# Patient Record
Sex: MALE | Race: BLACK OR AFRICAN AMERICAN | NOT HISPANIC OR LATINO | URBAN - METROPOLITAN AREA
[De-identification: names, ages, dates, MRNs, and addresses within clinical notes are randomized per-mention and may not be internally consistent; named-entity substitution may affect disease eponyms.]

---

## 2017-03-29 ENCOUNTER — OUTPATIENT (OUTPATIENT)
Dept: OUTPATIENT SERVICES | Facility: HOSPITAL | Age: 42
LOS: 1 days | Discharge: HOME | End: 2017-03-29

## 2017-06-27 DIAGNOSIS — K02.63 DENTAL CARIES ON SMOOTH SURFACE PENETRATING INTO PULP: ICD-10-CM

## 2020-04-20 ENCOUNTER — EMERGENCY (EMERGENCY)
Facility: HOSPITAL | Age: 45
LOS: 0 days | Discharge: HOME | End: 2020-04-21
Attending: EMERGENCY MEDICINE | Admitting: EMERGENCY MEDICINE
Payer: COMMERCIAL

## 2020-04-20 VITALS
TEMPERATURE: 98 F | RESPIRATION RATE: 18 BRPM | SYSTOLIC BLOOD PRESSURE: 143 MMHG | HEART RATE: 77 BPM | OXYGEN SATURATION: 99 % | DIASTOLIC BLOOD PRESSURE: 76 MMHG

## 2020-04-20 DIAGNOSIS — R07.89 OTHER CHEST PAIN: ICD-10-CM

## 2020-04-20 DIAGNOSIS — R07.9 CHEST PAIN, UNSPECIFIED: ICD-10-CM

## 2020-04-20 LAB
ALBUMIN SERPL ELPH-MCNC: 4.5 G/DL — SIGNIFICANT CHANGE UP (ref 3.5–5.2)
ALP SERPL-CCNC: 63 U/L — SIGNIFICANT CHANGE UP (ref 30–115)
ALT FLD-CCNC: 19 U/L — SIGNIFICANT CHANGE UP (ref 0–41)
ANION GAP SERPL CALC-SCNC: 10 MMOL/L — SIGNIFICANT CHANGE UP (ref 7–14)
AST SERPL-CCNC: 24 U/L — SIGNIFICANT CHANGE UP (ref 0–41)
BASOPHILS # BLD AUTO: 0.02 K/UL — SIGNIFICANT CHANGE UP (ref 0–0.2)
BASOPHILS NFR BLD AUTO: 0.3 % — SIGNIFICANT CHANGE UP (ref 0–1)
BILIRUB SERPL-MCNC: 0.3 MG/DL — SIGNIFICANT CHANGE UP (ref 0.2–1.2)
BUN SERPL-MCNC: 13 MG/DL — SIGNIFICANT CHANGE UP (ref 10–20)
CALCIUM SERPL-MCNC: 9.8 MG/DL — SIGNIFICANT CHANGE UP (ref 8.5–10.1)
CHLORIDE SERPL-SCNC: 103 MMOL/L — SIGNIFICANT CHANGE UP (ref 98–110)
CO2 SERPL-SCNC: 26 MMOL/L — SIGNIFICANT CHANGE UP (ref 17–32)
CREAT SERPL-MCNC: 0.9 MG/DL — SIGNIFICANT CHANGE UP (ref 0.7–1.5)
EOSINOPHIL # BLD AUTO: 0.1 K/UL — SIGNIFICANT CHANGE UP (ref 0–0.7)
EOSINOPHIL NFR BLD AUTO: 1.3 % — SIGNIFICANT CHANGE UP (ref 0–8)
GLUCOSE SERPL-MCNC: 92 MG/DL — SIGNIFICANT CHANGE UP (ref 70–99)
HCT VFR BLD CALC: 43.4 % — SIGNIFICANT CHANGE UP (ref 42–52)
HGB BLD-MCNC: 14.5 G/DL — SIGNIFICANT CHANGE UP (ref 14–18)
IMM GRANULOCYTES NFR BLD AUTO: 0.1 % — SIGNIFICANT CHANGE UP (ref 0.1–0.3)
LYMPHOCYTES # BLD AUTO: 1.57 K/UL — SIGNIFICANT CHANGE UP (ref 1.2–3.4)
LYMPHOCYTES # BLD AUTO: 19.8 % — LOW (ref 20.5–51.1)
MCHC RBC-ENTMCNC: 25.7 PG — LOW (ref 27–31)
MCHC RBC-ENTMCNC: 33.4 G/DL — SIGNIFICANT CHANGE UP (ref 32–37)
MCV RBC AUTO: 77 FL — LOW (ref 80–94)
MONOCYTES # BLD AUTO: 0.72 K/UL — HIGH (ref 0.1–0.6)
MONOCYTES NFR BLD AUTO: 9.1 % — SIGNIFICANT CHANGE UP (ref 1.7–9.3)
NEUTROPHILS # BLD AUTO: 5.49 K/UL — SIGNIFICANT CHANGE UP (ref 1.4–6.5)
NEUTROPHILS NFR BLD AUTO: 69.4 % — SIGNIFICANT CHANGE UP (ref 42.2–75.2)
NRBC # BLD: 0 /100 WBCS — SIGNIFICANT CHANGE UP (ref 0–0)
PLATELET # BLD AUTO: 272 K/UL — SIGNIFICANT CHANGE UP (ref 130–400)
POTASSIUM SERPL-MCNC: 5.1 MMOL/L — HIGH (ref 3.5–5)
POTASSIUM SERPL-SCNC: 5.1 MMOL/L — HIGH (ref 3.5–5)
PROT SERPL-MCNC: 7.7 G/DL — SIGNIFICANT CHANGE UP (ref 6–8)
RBC # BLD: 5.64 M/UL — SIGNIFICANT CHANGE UP (ref 4.7–6.1)
RBC # FLD: 15.4 % — HIGH (ref 11.5–14.5)
SODIUM SERPL-SCNC: 139 MMOL/L — SIGNIFICANT CHANGE UP (ref 135–146)
TROPONIN T SERPL-MCNC: <0.01 NG/ML — SIGNIFICANT CHANGE UP
TROPONIN T SERPL-MCNC: <0.01 NG/ML — SIGNIFICANT CHANGE UP
WBC # BLD: 7.91 K/UL — SIGNIFICANT CHANGE UP (ref 4.8–10.8)
WBC # FLD AUTO: 7.91 K/UL — SIGNIFICANT CHANGE UP (ref 4.8–10.8)

## 2020-04-20 PROCEDURE — 71046 X-RAY EXAM CHEST 2 VIEWS: CPT | Mod: 26

## 2020-04-20 PROCEDURE — 93010 ELECTROCARDIOGRAM REPORT: CPT

## 2020-04-20 PROCEDURE — 99220: CPT

## 2020-04-20 NOTE — ED ADULT TRIAGE NOTE - RESPIRATORY RATE (BREATHS/MIN)
18 - Please follow up with vascular surgery Dr. Maldonado before 9/7 for wound check and staples  - Please follow up with primary care physician after discharge  - please follow up with podiatry after discharge.

## 2020-04-20 NOTE — ED PROVIDER NOTE - NS ED ROS FT
CONSTITUTIONAL: (-) fevers, (-) chills, (-) diaphoresis  ENT: (-) congestion, (-) rhinorrhea, (-) sore throat  CARDIO: (+) chest pain, (-) palpitations, (-) edema  PULM: (-) cough, (-) sputum, (-) chest tightness, (-) shortness of breath, (-) wheezing, (-) hemoptysis  GI: (-) nausea, (-) vomiting, (-) diarrhea, (-) constipation, (-) abdominal pain, (-) melena, (-) hematochezia, (-) rectal bleeding  MSK: (-) back pain, (-) myalgias  SKIN: (-) rashes, (-) pallor  NEURO: (-) headache, (-) dizziness, (-) lightheadedness, (-) weakness, (-) syncope    *all other systems negative except as documented above and in the HPI*

## 2020-04-20 NOTE — ED PROVIDER NOTE - ATTENDING CONTRIBUTION TO CARE
44M no pmh, obese, p/w cp. intermittent midsternal pressure x 1 mo nonradiating, usually at night while laying down. no sob. no fever, cough. no trauma. no le edema, le pain, immobilization, hormones, hemoptysis. no tearing sensation. no fam hx. never smoker. no prior cardiac work up or cardiologist. pt waited to come to ED due to COVID pandemic.     on exam, AFVSS, well christen nad, ncat, eomi, perrla, mmm, lctab, rrr nl s1s2 no mrg, abd soft ntnd, aaox3, no focal deficits, no le edema or calf ttp,     a/p; cp, r/o acs, perc neg, will do labs, ekg/trop, cxr, tele asa re-eval. offered EDOU for provocative testing, pt is thinking about it

## 2020-04-20 NOTE — ED PROVIDER NOTE - PROGRESS NOTE DETAILS
JENNI: results discussed with patient, recommended admission to obs based off abnormal ekg. patient agreeable, sign out given to WESLEY Conteh.

## 2020-04-20 NOTE — ED PROVIDER NOTE - PHYSICAL EXAMINATION
VITALS:  I have reviewed the initial vital signs.  GENERAL: Well-developed, obese male, in no acute distress. Nontoxic.  HEENT: Sclera clear. No conjunctival pallor or injection. EOMI, PERRLA. Mucous membranes moist.  NECK: supple w FROM. No cervical adenopathy. No JVD.   CARDIO: RRR, nl S1 and S2. No murmurs, rubs, or gallops. No peripheral edema. 2+ radial and pedal pulses bilaterally. No chest wall tenderness.  PULM: Normal effort. CTA b/l without wheezes, rales, or rhonchi.  MSK: FROM to extremities x4. No calf warmth, swelling, erythema, or ttp.  GI: Abdomen soft and non-distended. Nontender.  SKIN: Warm, dry. No rash.  NEURO: A&Ox3. Speech clear. No gross motor/sensory deficits.

## 2020-04-20 NOTE — ED ADULT NURSE NOTE - OBJECTIVE STATEMENT
Patient c.o of midsternal intermittent c.p for the past month. Patient denies any medical hx or sob. Denies any covid symptoms or contact.

## 2020-04-20 NOTE — ED PROVIDER NOTE - CLINICAL SUMMARY MEDICAL DECISION MAKING FREE TEXT BOX
ekg w inf twi no prior to compare, cxr clear, trop neg, pt agreeable for edou for acs r/o and provocative testing

## 2020-04-20 NOTE — ED ADULT NURSE NOTE - NSIMPLEMENTINTERV_GEN_ALL_ED
Implemented All Universal Safety Interventions:  [place] to call system. Call bell, personal items and telephone within reach. Instruct patient to call for assistance. Room bathroom lighting operational. Non-slip footwear when patient is off stretcher. Physically safe environment: no spills, clutter or unnecessary equipment. Stretcher in lowest position, wheels locked, appropriate side rails in place.

## 2020-04-20 NOTE — ED PROVIDER NOTE - OBJECTIVE STATEMENT
44 year old male w no significant pmhx presents to the ED for evaluation of chest pain x 1 month. Pain is mid-sternal, intermittent, pressure-like, non-radiating. Pain is not pleuritic, positional, or worse with exertion. Last night patient's pain became more frequent, prompting ED evaluation. Denies fevers/chills, URI sx, cough, sob, wheezing, palpitations, syncope, diaphoresis, n/v, abd pain, leg pain/swelling, recent travel/surgeries/immobilizations/trauma. Denies history or fhx of MI/CAD/DVT/PE. Denies previous cardiac work up.

## 2020-04-20 NOTE — ED CDU PROVIDER INITIAL DAY NOTE - OBJECTIVE STATEMENT
44 year old male w no significant pmhx  comes to the ED c/o chest pain x 1 month. pts pain is non-radiating, mid-sternal, intermittent, aching in nature. Pain is not pleuritic, positional worse when lying down. Denies fevers/chills, URI sx, cough, sob, wheezing, palpitations, syncope, diaphoresis, n/v, abd pain, leg pain/swelling, recent travel/surgeries/immobilizations/trauma. Denies history or fhx of MI/CAD/DVT/PE. Denies previous cardiac work up.

## 2020-04-20 NOTE — ED CDU PROVIDER INITIAL DAY NOTE - MEDICAL DECISION MAKING DETAILS
44 Y M no PMH, pw chest pain x 1 month, midsubsternal, without exertional or pleuritic component, and not associated with cough, SOB, fever, chills, leg swelling, recent travel, steroids, malignancy, surgery, FHx of VTE or other PE risk factor. Exam shows comfortable male, heart RRR 60, lungs bcta, abd soft ntnd, ext nl ROM, awake, alert, oriented, normal speech.  A/p: CE x 2 negative, CXR neg for acute cardiopulmonary process, EKG w TWI in II, III, AVF w/o prior for comparison. Pending CCTA to continue elucidation of possible cardiac etiology of symptoms.

## 2020-04-21 VITALS — HEART RATE: 60 BPM | TEMPERATURE: 98 F | DIASTOLIC BLOOD PRESSURE: 84 MMHG | SYSTOLIC BLOOD PRESSURE: 124 MMHG

## 2020-04-21 PROCEDURE — 99217: CPT

## 2020-04-21 PROCEDURE — 75574 CT ANGIO HRT W/3D IMAGE: CPT | Mod: 26

## 2020-04-21 NOTE — ED ADULT NURSE REASSESSMENT NOTE - NS ED NURSE REASSESS COMMENT FT1
Pt appears comfortable in bed at this time. Pt denies chest discomfort at this time, vital signs are stable. Pt given dinner tray.
pt assessed, pt resting comfortable, pt denies any CP at this time, pt in normal sinus rhythm. Awaiting CTA.

## 2020-04-21 NOTE — ED CDU PROVIDER SUBSEQUENT DAY NOTE - MEDICAL DECISION MAKING DETAILS
44 Y M no PMH, pw chest pain x 1 month, midsubsternal, without exertional or pleuritic component, and not associated with cough, SOB, fever, chills, leg swelling, recent travel, steroids, malignancy, surgery, FHx of VTE or other PE risk factor. This morning feeling well, no pain or other complaint.  Exam: NAD, HEENT nl, Heart RRR 60, lungs bcta, abd soft ntnd, ext nl ROM, awake, alert, oriented, normal speech.  A/p: CE x 2 negative, CXR neg for acute cardiopulmonary process, EKG w TWI in II, III, AVF w/o prior for comparison. Pending CCTA to continue elucidation of possible cardiac etiology of symptoms.

## 2020-04-21 NOTE — ED CDU PROVIDER DISPOSITION NOTE - CARE PROVIDERS DIRECT ADDRESSES
[email or web address]

## 2020-04-21 NOTE — ED CDU PROVIDER DISPOSITION NOTE - PATIENT PORTAL LINK FT
You can access the FollowMyHealth Patient Portal offered by [facility] by registering at the following website: [email or web address]. By joining [employer]’s FollowMyHealth portal, you will also be able to view your health information using other applications (apps) compatible with our system.

## 2020-04-21 NOTE — ED CDU PROVIDER DISPOSITION NOTE - CLINICAL COURSE
44 Y M no PMH, pw chest pain x 1 month, midsubsternal, without exertional or pleuritic component, and not associated with cough, SOB, fever, chills, leg swelling, recent travel, steroids, malignancy, surgery, FHx of VTE or other PE risk factor. Exam shows comfortable male, heart RRR 60, lungs bcta, abd soft ntnd, ext nl ROM, awake, alert, oriented, normal speech.  A/p: CE x 2 negative, CXR neg for acute cardiopulmonary process, EKG w TWI in II, III, AVF w/o prior for comparison. CCTA performed, CAD-RADS 0. Persistently without pain. Patient to be discharged from ED. Any available test results were discussed with patient and/or family. Verbal instructions given, including instructions to return to ED immediately for any new, worsening, or concerning symptoms. Patient endorsed understanding. Written discharge instructions additionally given, including follow-up plan with cardiology and PMD.

## 2020-04-21 NOTE — ED CDU PROVIDER DISPOSITION NOTE - PROVIDER TOKENS
PROVIDER:[TOKEN:[[id number]],FOLLOWUP:[4-6 Days]],PROVIDER:[TOKEN:[[id number]],ESTABLISHEDPATIENT:[T]]

## 2020-04-21 NOTE — ED CDU PROVIDER DISPOSITION NOTE - CARE PROVIDER_API CALL
Brandon Castrejon)  Cardiovascular Disease; Internal Medicine; Interventional Cardiology  [address] [place] [address]  Phone: [phone]  Fax: [phone]  Follow Up Time: 4-6 Days    Christina Vides)  Cardiology; Interventional Cardiology  [address] [place] [address]  Phone: [phone]  Fax: [phone]  Established Patient  Follow Up Time:

## 2020-04-21 NOTE — ED CDU PROVIDER SUBSEQUENT DAY NOTE - PROGRESS NOTE DETAILS
pt seen bedside, NAD, no complaints overnight, asymptomatic. Negative cardiac enzymes x2 and nl ekg. pt scheduled to go for CCTA. Will continue to monitor patient.

## 2022-07-30 PROBLEM — Z00.00 ENCOUNTER FOR PREVENTIVE HEALTH EXAMINATION: Status: ACTIVE | Noted: 2022-07-30

## 2024-08-12 ENCOUNTER — EMERGENCY (EMERGENCY)
Facility: HOSPITAL | Age: 49
LOS: 0 days | Discharge: HOME | End: 2024-08-12
Attending: EMERGENCY MEDICINE
Payer: COMMERCIAL

## 2024-08-12 VITALS
DIASTOLIC BLOOD PRESSURE: 81 MMHG | HEART RATE: 64 BPM | OXYGEN SATURATION: 98 % | WEIGHT: 300.05 LBS | RESPIRATION RATE: 18 BRPM | HEIGHT: 72 IN | TEMPERATURE: 98 F | SYSTOLIC BLOOD PRESSURE: 120 MMHG

## 2024-08-12 DIAGNOSIS — M79.602 PAIN IN LEFT ARM: ICD-10-CM

## 2024-08-12 DIAGNOSIS — M54.2 CERVICALGIA: ICD-10-CM

## 2024-08-12 PROBLEM — Z78.9 OTHER SPECIFIED HEALTH STATUS: Chronic | Status: ACTIVE | Noted: 2020-04-20

## 2024-08-12 PROCEDURE — 73030 X-RAY EXAM OF SHOULDER: CPT | Mod: LT

## 2024-08-12 PROCEDURE — 99284 EMERGENCY DEPT VISIT MOD MDM: CPT

## 2024-08-12 PROCEDURE — 99283 EMERGENCY DEPT VISIT LOW MDM: CPT | Mod: 25

## 2024-08-12 PROCEDURE — 73030 X-RAY EXAM OF SHOULDER: CPT | Mod: 26,LT

## 2024-08-12 RX ORDER — LIDOCAINE 5% 5 G/100G
1 CREAM TOPICAL ONCE
Refills: 0 | Status: COMPLETED | OUTPATIENT
Start: 2024-08-12 | End: 2024-08-12

## 2024-08-12 RX ORDER — MELOXICAM 15 MG
1 TABLET ORAL
Qty: 10 | Refills: 0
Start: 2024-08-12 | End: 2024-08-21

## 2024-08-12 RX ORDER — LIDOCAINE 5% 5 G/100G
1 CREAM TOPICAL
Qty: 2 | Refills: 0
Start: 2024-08-12 | End: 2024-08-16

## 2024-08-12 RX ORDER — ACETAMINOPHEN 500 MG
975 TABLET ORAL ONCE
Refills: 0 | Status: COMPLETED | OUTPATIENT
Start: 2024-08-12 | End: 2024-08-12

## 2024-08-12 RX ADMIN — LIDOCAINE 5% 1 PATCH: 5 CREAM TOPICAL at 18:19

## 2024-08-12 RX ADMIN — Medication 975 MILLIGRAM(S): at 18:19

## 2024-08-12 NOTE — ED ADULT NURSE NOTE - OBJECTIVE STATEMENT
pt  came in c/o L shoulder pain that has been going on for a year, started having arm pain to his L arm for the past 2 weeks. denies chest pain

## 2024-08-12 NOTE — ED PROVIDER NOTE - OBJECTIVE STATEMENT
Patient is a 48 year old, right-handed male with no significant PMH presenting for arm pain. Patient endorses intermittent atraumatic left trapezial pain that is worse with movement for several months, as well as intermittent throbbing left arm pain for the past two weeks. Patient denies recent trauma, chest pain, shortness of breath, arm numbness/weakness, neck pain, dizziness, or additional concerns.

## 2024-08-12 NOTE — ED PROVIDER NOTE - PATIENT PORTAL LINK FT
You can access the FollowMyHealth Patient Portal offered by [facility] by registering at the following website: [email or web address]. By joining [facility]’s FollowMyHealth portal, you will also be able to view your health information using other applications (apps) compatible with our system.

## 2024-08-12 NOTE — ED PROVIDER NOTE - NSPTACCESSSVCSAPPTDETAILS_ED_ALL_ED_FT
left arm throbbing pain NeuroSx/Ortho: left arm/neck throbbing pain; suspected cervical radiculopathy

## 2024-08-12 NOTE — ED ADULT NURSE NOTE - NSFALLUNIVINTERV_ED_ALL_ED
Bed/Stretcher in lowest position, wheels locked, appropriate side rails in place/Call bell, personal items and telephone in reach/Instruct patient to call for assistance before getting out of bed/chair/stretcher/Non-slip footwear applied when patient is off stretcher/[place] to call system/Physically safe environment - no spills, clutter or unnecessary equipment/Purposeful proactive rounding/Room/bathroom lighting operational, light cord in reach

## 2024-08-12 NOTE — ED ADULT NURSE NOTE - NSFALLRISK_ED_ALL_ED
[facility] from [facility]:  Original authorizing provider: Viktoria Rebollar MD, MD Carolyn Alba would like a refill of the following medications:   600 MG CAPS capsule [Viktoria Rebollar MD, MD]    Preferred pharmacy: [facility] [address] [place] [address] AT [facility] OF [address] [place] [address]    Comment:  This message is being sent by Sheba Alba on behalf of Carolyn Alba   
No

## 2024-08-12 NOTE — ED PROVIDER NOTE - NSFOLLOWUPINSTRUCTIONS_ED_ALL_ED_FT
Musculoskeletal Pain    Musculoskeletal pain is muscle and enrique aches and pains. These pains can occur in any part of the body. Your caregiver may treat you without knowing the cause of the pain. They may treat you if blood or urine tests, X-rays, and other tests were normal.     CAUSES  There is often not a definite cause or reason for these pains. These pains may be caused by a type of germ (virus). The discomfort may also come from overuse. Overuse includes working out too hard when your body is not fit. Enrique aches also come from weather changes. Bone is sensitive to atmospheric pressure changes.    HOME CARE INSTRUCTIONS  Ask when your test results will be ready. Make sure you get your test results.  Only take over-the-counter or prescription medicines for pain, discomfort, or fever as directed by your caregiver. If you were given medications for your condition, do not drive, operate machinery or power tools, or sign legal documents for 24 hours. Do not drink alcohol. Do not take sleeping pills or other medications that may interfere with treatment.  Continue all activities unless the activities cause more pain. When the pain lessens, slowly resume normal activities. Gradually increase the intensity and duration of the activities or exercise.   During periods of severe pain, bed rest may be helpful. Lay or sit in any position that is comfortable.   Putting ice on the injured area.  Put ice in a bag.   Place a towel between your skin and the bag.  Leave the ice on for 15 to 20 minutes, 3 to 4 times a day.   Follow up with your caregiver for continued problems and no reason can be found for the pain. If the pain becomes worse or does not go away, it may be necessary to repeat tests or do additional testing. Your caregiver may need to look further for a possible cause.    SEEK IMMEDIATE MEDICAL CARE IF:  You have pain that is getting worse and is not relieved by medications.  You develop chest pain that is associated with shortness or breath, sweating, feeling sick to your stomach (nauseous), or throw up (vomit).  Your pain becomes localized to the abdomen.  You develop any new symptoms that seem different or that concern you.    MAKE SURE YOU:  Understand these instructions.   Will watch your condition.  Will get help right away if you are not doing well or get worse.    ADDITIONAL NOTES AND INSTRUCTIONS    Please follow up with your Primary MD in 24-48 hr.  Seek immediate medical care for any new/worsening signs or symptoms. Our Emergency Department Referral Coordinators will be reaching out to you in the next 24-48 hours from 9:00am to 5:00pm with a follow up appointment. Please expect a phone call from the hospital in that time frame. If you do not receive a call or if you have any questions or concerns, you can reach them at   [phone]     Musculoskeletal Pain    Musculoskeletal pain is muscle and enrique aches and pains. These pains can occur in any part of the body. Your caregiver may treat you without knowing the cause of the pain. They may treat you if blood or urine tests, X-rays, and other tests were normal.     CAUSES  There is often not a definite cause or reason for these pains. These pains may be caused by a type of germ (virus). The discomfort may also come from overuse. Overuse includes working out too hard when your body is not fit. Enrique aches also come from weather changes. Bone is sensitive to atmospheric pressure changes.    HOME CARE INSTRUCTIONS  Ask when your test results will be ready. Make sure you get your test results.  Only take over-the-counter or prescription medicines for pain, discomfort, or fever as directed by your caregiver. If you were given medications for your condition, do not drive, operate machinery or power tools, or sign legal documents for 24 hours. Do not drink alcohol. Do not take sleeping pills or other medications that may interfere with treatment.  Continue all activities unless the activities cause more pain. When the pain lessens, slowly resume normal activities. Gradually increase the intensity and duration of the activities or exercise.   During periods of severe pain, bed rest may be helpful. Lay or sit in any position that is comfortable.   Putting ice on the injured area.  Put ice in a bag.   Place a towel between your skin and the bag.  Leave the ice on for 15 to 20 minutes, 3 to 4 times a day.   Follow up with your caregiver for continued problems and no reason can be found for the pain. If the pain becomes worse or does not go away, it may be necessary to repeat tests or do additional testing. Your caregiver may need to look further for a possible cause.    SEEK IMMEDIATE MEDICAL CARE IF:  You have pain that is getting worse and is not relieved by medications.  You develop chest pain that is associated with shortness or breath, sweating, feeling sick to your stomach (nauseous), or throw up (vomit).  Your pain becomes localized to the abdomen.  You develop any new symptoms that seem different or that concern you.    MAKE SURE YOU:  Understand these instructions.   Will watch your condition.  Will get help right away if you are not doing well or get worse.    ADDITIONAL NOTES AND INSTRUCTIONS    Please follow up with your Primary MD in 24-48 hr.  Seek immediate medical care for any new/worsening signs or symptoms.

## 2024-08-12 NOTE — ED PROVIDER NOTE - PHYSICAL EXAMINATION
VITAL SIGNS: I have reviewed nursing notes and confirm.  CONSTITUTIONAL: Well-appearing, non-toxic, in NAD  SKIN: Warm dry, normal skin turgor  HEAD: NCAT  EYES: No conjunctival injection, scleral anicteric  ENT: Moist mucous membranes, normal pharynx with no erythema or exudates  NECK: Supple; full ROM. Nontender. No cervical LAD  CARD: RRR, no murmurs, rubs or gallops  RESP: Clear to ausculation bilaterally.  No rales, rhonchi, or wheezing.  ABD: Soft, non-distended, non-tender, no rebound or guarding. No CVA tenderness  EXT: Full ROM, no bony tenderness, no pedal edema, no calf tenderness, capillary refill <2sec; neurovascularly intact  NEURO: Normal motor, normal sensory.   PSYCH: Cooperative, appropriate.

## 2024-08-12 NOTE — ED PROVIDER NOTE - CLINICAL SUMMARY MEDICAL DECISION MAKING FREE TEXT BOX
48-year-old male with no significant past medical history presents with left neck and arm pain.  States that he intermittently has had left sided neck pain rating to his left shoulder for the past few months but recently over the last couple of weeks has had pain going down his left arm and feels like a heaviness/occasional numbness.  No weakness.  Denies chest pain.  Worse when he turns his neck.  On exam nontoxic, vital signs noted, nontender paracervical area but mild trapezial tenderness.  Positive Spurling's.  5 out of 5 strength left upper extremity and neurovascular intact in radial, median and ulnar distribution.  No other focal neurological deficits or signs.  Given pain medicine with some improvement.  Discussed possible cervical radiculopathy and need for follow-up with the patient.  He understands and is comfortable with this plan.  In my opinion, based on current evaluation and results, an acute medical or surgical emergency does not appear to be occurring at this time and I feel that the pt is stable for further outpatient work up and/or treatment.  Return precautions discussed.

## 2024-08-12 NOTE — ED PROVIDER NOTE - NSFOLLOWUPCLINICS_GEN_ALL_ED_FT
[facility]  Orthpedic  [address]  [place], [place]   Phone: [phone]  Fax:   Follow Up Time: Routine    Neurosurgery at [place]  Neurosurgery  [address] [place] [address], [address]  [place] [address]  Phone: [phone]  Fax:   Follow Up Time: Routine

## 2024-08-15 ENCOUNTER — APPOINTMENT (OUTPATIENT)
Dept: ORTHOPEDIC SURGERY | Facility: CLINIC | Age: 49
End: 2024-08-15
Payer: COMMERCIAL

## 2024-08-15 DIAGNOSIS — S43.432A SUPERIOR GLENOID LABRUM LESION OF LEFT SHOULDER, INITIAL ENCOUNTER: ICD-10-CM

## 2024-08-15 DIAGNOSIS — M54.12 RADICULOPATHY, CERVICAL REGION: ICD-10-CM

## 2024-08-15 PROCEDURE — 99203 OFFICE O/P NEW LOW 30 MIN: CPT | Mod: 25

## 2024-08-15 PROCEDURE — 72040 X-RAY EXAM NECK SPINE 2-3 VW: CPT

## 2024-08-15 RX ORDER — NAPROXEN 500 MG/1
500 TABLET ORAL
Qty: 60 | Refills: 0 | Status: ACTIVE | COMMUNITY
Start: 2024-08-15 | End: 1900-01-01

## 2024-08-15 NOTE — DATA REVIEWED
[Bilateral] : of the bilateral [Cervical Spine] : cervical spine [FreeTextEntry1] : 2 views of the cervical spine were obtained here in the office today and show: Almost complete loss of normal without a curvature with some degenerative changes of the cervical spine.  No fracture or dislocation.

## 2024-08-15 NOTE — IMAGING
[de-identified] : Physical exam of the left shoulder: No tenderness to palpation over the left AC joint or the anterior aspect of the shoulder.  No tenderness to palpation over the left trapezius.  Good range of motion with forward flexion, abduction and internal rotation.  Pain with impingement testing.  Good strength and no pain with rotator cuff resistance testing.  Positive Renville's testing.  Intact to light touch.  Physical exam of the cervical spine: Good range of motion with extension, flexion and lateral rotation.  He does have some pain with lateral rotation.  No tenderness to palpation over the cervical vertebrae or the right and left-sided cervical paraspinal muscles.  5 out of 5 upper extremity strength bilaterally and 5 out of 5  strength bilaterally.  Intact to light touch.

## 2024-08-15 NOTE — HISTORY OF PRESENT ILLNESS
[de-identified] : The patient is a 48-year-old male right-hand-dominant here for evaluation of his left shoulder.  He has had pain in the left shoulder for over a year.  A few weeks ago he was doing [place] ropes and felt pain afterwards.  He points over the left trapezius as to where the pain is.  It increases in severity with air-conditioning and a fan.  The pain goes down into his left wrist and he reports numbness in the left arm.  No neck pain.  He was seen and evaluated at [facility] on 8/12/2024 and had x-rays of the left shoulder that showed: No fracture or dislocation.  No glenohumeral joint arthritis.  No soft tissue calcifications or bone masses.

## 2024-08-15 NOTE — DISCUSSION/SUMMARY
[de-identified] : I reviewed the x-ray findings with the patient.  He will start a course of formal physical therapy for the cervical spine and the left shoulder.  I did explain to him that he likely has a neck problem and the shoulder problem.  He will start anti-inflammatory medication, Rx for naproxen sent to the pharmacy.  He can use Tylenol for pain.  I will see him back in 6 weeks for further evaluation.  If he is still symptomatic I would recommend further imaging studies for both the left shoulder and the cervical spine.

## 2024-09-30 ENCOUNTER — APPOINTMENT (OUTPATIENT)
Dept: ORTHOPEDIC SURGERY | Facility: CLINIC | Age: 49
End: 2024-09-30

## 2025-04-03 ENCOUNTER — APPOINTMENT (OUTPATIENT)
Dept: UROLOGY | Facility: CLINIC | Age: 50
End: 2025-04-03
Payer: COMMERCIAL

## 2025-04-03 VITALS
HEIGHT: 72 IN | OXYGEN SATURATION: 93 % | WEIGHT: 315 LBS | DIASTOLIC BLOOD PRESSURE: 92 MMHG | HEART RATE: 71 BPM | RESPIRATION RATE: 18 BRPM | SYSTOLIC BLOOD PRESSURE: 141 MMHG | BODY MASS INDEX: 42.66 KG/M2

## 2025-04-03 DIAGNOSIS — N40.1 BENIGN PROSTATIC HYPERPLASIA WITH LOWER URINARY TRACT SYMPMS: ICD-10-CM

## 2025-04-03 DIAGNOSIS — N13.8 BENIGN PROSTATIC HYPERPLASIA WITH LOWER URINARY TRACT SYMPMS: ICD-10-CM

## 2025-04-03 DIAGNOSIS — F17.200 NICOTINE DEPENDENCE, UNSPECIFIED, UNCOMPLICATED: ICD-10-CM

## 2025-04-03 DIAGNOSIS — R35.1 NOCTURIA: ICD-10-CM

## 2025-04-03 DIAGNOSIS — R39.198 OTHER DIFFICULTIES WITH MICTURITION: ICD-10-CM

## 2025-04-03 PROCEDURE — 99203 OFFICE O/P NEW LOW 30 MIN: CPT
